# Patient Record
Sex: FEMALE | Race: WHITE | NOT HISPANIC OR LATINO
[De-identification: names, ages, dates, MRNs, and addresses within clinical notes are randomized per-mention and may not be internally consistent; named-entity substitution may affect disease eponyms.]

---

## 2022-08-22 PROBLEM — Z00.00 ENCOUNTER FOR PREVENTIVE HEALTH EXAMINATION: Status: ACTIVE | Noted: 2022-08-22

## 2022-08-23 ENCOUNTER — APPOINTMENT (OUTPATIENT)
Dept: OTOLARYNGOLOGY | Facility: CLINIC | Age: 82
End: 2022-08-23

## 2022-08-23 VITALS — HEIGHT: 66 IN | TEMPERATURE: 95.8 F | WEIGHT: 150 LBS | BODY MASS INDEX: 24.11 KG/M2

## 2022-08-23 DIAGNOSIS — H93.8X3 OTHER SPECIFIED DISORDERS OF EAR, BILATERAL: ICD-10-CM

## 2022-08-23 DIAGNOSIS — H61.23 IMPACTED CERUMEN, BILATERAL: ICD-10-CM

## 2022-08-23 PROCEDURE — 69210 REMOVE IMPACTED EAR WAX UNI: CPT

## 2022-08-23 PROCEDURE — 99203 OFFICE O/P NEW LOW 30 MIN: CPT | Mod: 25

## 2022-08-23 RX ORDER — ATORVASTATIN CALCIUM 10 MG/1
10 TABLET, FILM COATED ORAL
Refills: 0 | Status: ACTIVE | COMMUNITY

## 2022-08-23 RX ORDER — ENALAPRIL MALEATE 10 MG/1
10 TABLET ORAL
Refills: 0 | Status: ACTIVE | COMMUNITY

## 2022-08-23 NOTE — ASSESSMENT
[FreeTextEntry1] : Patient with hx of cerumen issues.  Here for cerumen removal.  Bilateral impacted cerumen removed and exam normal after.  Discussed audio but patient will defer - followup in one year and as necessary

## 2022-08-23 NOTE — HISTORY OF PRESENT ILLNESS
[de-identified] : Hx of cerumen in past.  Used to be Dr Soria patient and had yearly cleaning - no eval for about 2 years now.  Occ feels ears clogged.  No prior ear problems.

## 2022-08-23 NOTE — PHYSICAL EXAM
[Midline] : trachea located in midline position [Normal] : no rashes [de-identified] : bilat impacted cerumen  [de-identified] : after cerumen removal

## 2023-02-26 ENCOUNTER — INPATIENT (INPATIENT)
Facility: HOSPITAL | Age: 83
LOS: 1 days | Discharge: HOME | DRG: 378 | End: 2023-02-28
Attending: SURGERY | Admitting: SURGERY
Payer: MEDICARE

## 2023-02-26 VITALS
DIASTOLIC BLOOD PRESSURE: 87 MMHG | OXYGEN SATURATION: 100 % | HEART RATE: 87 BPM | TEMPERATURE: 98 F | SYSTOLIC BLOOD PRESSURE: 165 MMHG | RESPIRATION RATE: 18 BRPM

## 2023-02-26 DIAGNOSIS — Z90.49 ACQUIRED ABSENCE OF OTHER SPECIFIED PARTS OF DIGESTIVE TRACT: Chronic | ICD-10-CM

## 2023-02-26 DIAGNOSIS — Z98.890 OTHER SPECIFIED POSTPROCEDURAL STATES: Chronic | ICD-10-CM

## 2023-02-26 DIAGNOSIS — K92.2 GASTROINTESTINAL HEMORRHAGE, UNSPECIFIED: ICD-10-CM

## 2023-02-26 LAB
ABO RH CONFIRMATION: SIGNIFICANT CHANGE UP
ALBUMIN SERPL ELPH-MCNC: 3.1 G/DL — LOW (ref 3.3–5)
ALP SERPL-CCNC: 57 U/L — SIGNIFICANT CHANGE UP (ref 40–120)
ALT FLD-CCNC: 26 U/L — SIGNIFICANT CHANGE UP (ref 12–78)
ANION GAP SERPL CALC-SCNC: 4 MMOL/L — LOW (ref 5–17)
APTT BLD: 25.1 SEC — LOW (ref 27.5–35.5)
AST SERPL-CCNC: 21 U/L — SIGNIFICANT CHANGE UP (ref 15–37)
BASOPHILS # BLD AUTO: 0.07 K/UL — SIGNIFICANT CHANGE UP (ref 0–0.2)
BASOPHILS NFR BLD AUTO: 0.6 % — SIGNIFICANT CHANGE UP (ref 0–2)
BILIRUB SERPL-MCNC: 0.6 MG/DL — SIGNIFICANT CHANGE UP (ref 0.2–1.2)
BUN SERPL-MCNC: 14 MG/DL — SIGNIFICANT CHANGE UP (ref 7–23)
CALCIUM SERPL-MCNC: 8.3 MG/DL — LOW (ref 8.5–10.1)
CHLORIDE SERPL-SCNC: 108 MMOL/L — SIGNIFICANT CHANGE UP (ref 96–108)
CO2 SERPL-SCNC: 25 MMOL/L — SIGNIFICANT CHANGE UP (ref 22–31)
CREAT SERPL-MCNC: 0.65 MG/DL — SIGNIFICANT CHANGE UP (ref 0.5–1.3)
EGFR: 88 ML/MIN/1.73M2 — SIGNIFICANT CHANGE UP
EOSINOPHIL # BLD AUTO: 0.16 K/UL — SIGNIFICANT CHANGE UP (ref 0–0.5)
EOSINOPHIL NFR BLD AUTO: 1.5 % — SIGNIFICANT CHANGE UP (ref 0–6)
FLUAV AG NPH QL: SIGNIFICANT CHANGE UP
FLUBV AG NPH QL: SIGNIFICANT CHANGE UP
GLUCOSE SERPL-MCNC: 128 MG/DL — HIGH (ref 70–99)
HCT VFR BLD CALC: 28.9 % — LOW (ref 34.5–45)
HCT VFR BLD CALC: 30.4 % — LOW (ref 34.5–45)
HGB BLD-MCNC: 10.4 G/DL — LOW (ref 11.5–15.5)
HGB BLD-MCNC: 9.8 G/DL — LOW (ref 11.5–15.5)
IMM GRANULOCYTES NFR BLD AUTO: 0.3 % — SIGNIFICANT CHANGE UP (ref 0–0.9)
INR BLD: 1.05 RATIO — SIGNIFICANT CHANGE UP (ref 0.88–1.16)
LIDOCAIN IGE QN: 238 U/L — SIGNIFICANT CHANGE UP (ref 73–393)
LYMPHOCYTES # BLD AUTO: 1.72 K/UL — SIGNIFICANT CHANGE UP (ref 1–3.3)
LYMPHOCYTES # BLD AUTO: 15.7 % — SIGNIFICANT CHANGE UP (ref 13–44)
MCHC RBC-ENTMCNC: 31.4 PG — SIGNIFICANT CHANGE UP (ref 27–34)
MCHC RBC-ENTMCNC: 31.8 PG — SIGNIFICANT CHANGE UP (ref 27–34)
MCHC RBC-ENTMCNC: 33.9 GM/DL — SIGNIFICANT CHANGE UP (ref 32–36)
MCHC RBC-ENTMCNC: 34.2 GM/DL — SIGNIFICANT CHANGE UP (ref 32–36)
MCV RBC AUTO: 92.6 FL — SIGNIFICANT CHANGE UP (ref 80–100)
MCV RBC AUTO: 93 FL — SIGNIFICANT CHANGE UP (ref 80–100)
MONOCYTES # BLD AUTO: 0.74 K/UL — SIGNIFICANT CHANGE UP (ref 0–0.9)
MONOCYTES NFR BLD AUTO: 6.7 % — SIGNIFICANT CHANGE UP (ref 2–14)
NEUTROPHILS # BLD AUTO: 8.25 K/UL — HIGH (ref 1.8–7.4)
NEUTROPHILS NFR BLD AUTO: 75.2 % — SIGNIFICANT CHANGE UP (ref 43–77)
NT-PROBNP SERPL-SCNC: 53 PG/ML — SIGNIFICANT CHANGE UP (ref 0–450)
PLATELET # BLD AUTO: 220 K/UL — SIGNIFICANT CHANGE UP (ref 150–400)
PLATELET # BLD AUTO: 227 K/UL — SIGNIFICANT CHANGE UP (ref 150–400)
POTASSIUM SERPL-MCNC: 3.5 MMOL/L — SIGNIFICANT CHANGE UP (ref 3.5–5.3)
POTASSIUM SERPL-SCNC: 3.5 MMOL/L — SIGNIFICANT CHANGE UP (ref 3.5–5.3)
PROT SERPL-MCNC: 6.3 GM/DL — SIGNIFICANT CHANGE UP (ref 6–8.3)
PROTHROM AB SERPL-ACNC: 12.2 SEC — SIGNIFICANT CHANGE UP (ref 10.5–13.4)
RBC # BLD: 3.12 M/UL — LOW (ref 3.8–5.2)
RBC # BLD: 3.27 M/UL — LOW (ref 3.8–5.2)
RBC # FLD: 12.7 % — SIGNIFICANT CHANGE UP (ref 10.3–14.5)
RBC # FLD: 12.8 % — SIGNIFICANT CHANGE UP (ref 10.3–14.5)
RSV RNA NPH QL NAA+NON-PROBE: SIGNIFICANT CHANGE UP
SARS-COV-2 RNA SPEC QL NAA+PROBE: SIGNIFICANT CHANGE UP
SODIUM SERPL-SCNC: 137 MMOL/L — SIGNIFICANT CHANGE UP (ref 135–145)
TROPONIN I, HIGH SENSITIVITY RESULT: 5.9 NG/L — SIGNIFICANT CHANGE UP
WBC # BLD: 10.97 K/UL — HIGH (ref 3.8–10.5)
WBC # BLD: 9.37 K/UL — SIGNIFICANT CHANGE UP (ref 3.8–10.5)
WBC # FLD AUTO: 10.97 K/UL — HIGH (ref 3.8–10.5)
WBC # FLD AUTO: 9.37 K/UL — SIGNIFICANT CHANGE UP (ref 3.8–10.5)

## 2023-02-26 PROCEDURE — 85027 COMPLETE CBC AUTOMATED: CPT

## 2023-02-26 PROCEDURE — 93010 ELECTROCARDIOGRAM REPORT: CPT

## 2023-02-26 PROCEDURE — 85018 HEMOGLOBIN: CPT

## 2023-02-26 PROCEDURE — 71045 X-RAY EXAM CHEST 1 VIEW: CPT | Mod: 26

## 2023-02-26 PROCEDURE — 83735 ASSAY OF MAGNESIUM: CPT

## 2023-02-26 PROCEDURE — 70450 CT HEAD/BRAIN W/O DYE: CPT | Mod: 26,MA

## 2023-02-26 PROCEDURE — 97163 PT EVAL HIGH COMPLEX 45 MIN: CPT | Mod: GP

## 2023-02-26 PROCEDURE — 93306 TTE W/DOPPLER COMPLETE: CPT

## 2023-02-26 PROCEDURE — 97116 GAIT TRAINING THERAPY: CPT | Mod: GP

## 2023-02-26 PROCEDURE — 74177 CT ABD & PELVIS W/CONTRAST: CPT | Mod: 26,MA

## 2023-02-26 PROCEDURE — 72125 CT NECK SPINE W/O DYE: CPT | Mod: 26,MA

## 2023-02-26 PROCEDURE — 36430 TRANSFUSION BLD/BLD COMPNT: CPT

## 2023-02-26 PROCEDURE — 84100 ASSAY OF PHOSPHORUS: CPT

## 2023-02-26 PROCEDURE — 36415 COLL VENOUS BLD VENIPUNCTURE: CPT

## 2023-02-26 PROCEDURE — 99291 CRITICAL CARE FIRST HOUR: CPT

## 2023-02-26 PROCEDURE — 80048 BASIC METABOLIC PNL TOTAL CA: CPT

## 2023-02-26 RX ORDER — CHLORHEXIDINE GLUCONATE 213 G/1000ML
1 SOLUTION TOPICAL
Refills: 0 | Status: DISCONTINUED | OUTPATIENT
Start: 2023-02-26 | End: 2023-02-28

## 2023-02-26 RX ORDER — ATORVASTATIN CALCIUM 80 MG/1
1 TABLET, FILM COATED ORAL
Qty: 0 | Refills: 0 | DISCHARGE

## 2023-02-26 RX ORDER — ASPIRIN/CALCIUM CARB/MAGNESIUM 324 MG
1 TABLET ORAL
Qty: 0 | Refills: 0 | DISCHARGE

## 2023-02-26 RX ORDER — MULTIVIT-MIN/FERROUS GLUCONATE 9 MG/15 ML
1 LIQUID (ML) ORAL
Qty: 0 | Refills: 0 | DISCHARGE

## 2023-02-26 RX ORDER — SODIUM CHLORIDE 9 MG/ML
1000 INJECTION, SOLUTION INTRAVENOUS
Refills: 0 | Status: DISCONTINUED | OUTPATIENT
Start: 2023-02-26 | End: 2023-02-28

## 2023-02-26 RX ORDER — SODIUM CHLORIDE 9 MG/ML
1000 INJECTION INTRAMUSCULAR; INTRAVENOUS; SUBCUTANEOUS ONCE
Refills: 0 | Status: COMPLETED | OUTPATIENT
Start: 2023-02-26 | End: 2023-02-26

## 2023-02-26 RX ORDER — CHOLECALCIFEROL (VITAMIN D3) 125 MCG
1 CAPSULE ORAL
Qty: 0 | Refills: 0 | DISCHARGE

## 2023-02-26 RX ADMIN — SODIUM CHLORIDE 1000 MILLILITER(S): 9 INJECTION INTRAMUSCULAR; INTRAVENOUS; SUBCUTANEOUS at 21:21

## 2023-02-26 NOTE — ED PROVIDER NOTE - PROGRESS NOTE DETAILS
Pt has active GI bleed. Dr. Calvo of GI consulted, and Dr. Tirado of IR consulted. No need for intervention at this time. Pt had episode of hypotension, and admitted to ICU.

## 2023-02-26 NOTE — ED ADULT NURSE REASSESSMENT NOTE - NS ED NURSE REASSESS COMMENT FT1
Patient at around 2035 stated to nursing staff that "I am starting to feel very nauseous and off". Pt then had another syncopal episode that lasted approximately 5 seconds. Dr. Gaines at bedside, pt to be evaluated by ICU team.

## 2023-02-26 NOTE — PATIENT PROFILE ADULT - FALL HARM RISK - HARM RISK INTERVENTIONS
Assistance with ambulation/Assistance OOB with selected safe patient handling equipment/Communicate Risk of Fall with Harm to all staff/Monitor gait and stability/Reinforce activity limits and safety measures with patient and family/Sit up slowly, dangle for a short time, stand at bedside before walking/Tailored Fall Risk Interventions/Visual Cue: Yellow wristband and red socks/Bed in lowest position, wheels locked, appropriate side rails in place/Call bell, personal items and telephone in reach/Instruct patient to call for assistance before getting out of bed or chair/Non-slip footwear when patient is out of bed/[place] to call system/Physically safe environment - no spills, clutter or unnecessary equipment/Purposeful Proactive Rounding/Room/bathroom lighting operational, light cord in reach

## 2023-02-26 NOTE — H&P ADULT - ASSESSMENT
Impression:  1. acute blood loss anemia  2. acute gastrointestinal hemorrhage  3. essential HTN      Plan:  Admit to CICU.     Neuro - stable, nonfocal, CTH and C-spine, negative    CV -  hemodynamics stable, NSR 90s, no tachycardia currently            hold anti-HTN for now in face of GIB            no APT     Pulm -  stable on RA                CXR without active disease    GI -  NPO           serial CBCs          currently receiving 1U pRBCs           GI consulted - Dr Cuevas by ED           IR consulted - Dr Olvera by ED           if further bleeding with drop in H/H will reengage IR/GI overnight           tx for Hbg<8 or ongoing signs of active bleeding           no APT or AC    Renal - Cr stable, strict I/Os, gentle IV hydration with balanced fluids, BMP in am    Heme -  no pharmacologic DVT PPx/APT/AC in face of GIB, SCDs, serial CBCs, tx for Hbg<8 or active signs of bleeding.     ID - no indication for abx at this time.    Endo -  BS stable.                                                         Impression:  1. acute blood loss anemia  2. acute gastrointestinal hemorrhage  3. essential HTN      Plan:  Admit to CICU. Case and plan discussed with eICU attending Dr Wells.    Neuro - stable, nonfocal, CTH and C-spine, negative    CV -  hemodynamics stable, NSR 90s, no tachycardia currently            hold anti-HTN for now in face of GIB            no APT     Pulm -  stable on RA                CXR without active disease    GI -  NPO           serial CBCs          currently receiving 1U pRBCs           GI consulted - Dr Cuevas by ED           IR consulted - Dr Olvera by ED           if further bleeding with drop in H/H will reengage IR/GI overnight           tx for Hbg<8 or ongoing signs of active bleeding           no APT or AC    Renal - Cr stable, strict I/Os, gentle IV hydration with balanced fluids, BMP in am    Heme -  no pharmacologic DVT PPx/APT/AC in face of GIB, SCDs, serial CBCs, tx for Hbg<8 or active signs of bleeding.     ID - no indication for abx at this time.    Endo -  BS stable.                                                         Impression:  1. acute blood loss anemia  2. acute gastrointestinal hemorrhage  3. essential HTN      Plan:  Admit to CICU. Case and plan discussed with eICU attending Dr Wells.    Neuro - stable, nonfocal, CTH and C-spine, negative    CV -  hemodynamics stable, NSR 90s, no tachycardia currently            hold anti-HTN for now in face of GIB            no APT     Pulm -  stable on RA                CXR without active disease    GI -  NPO           serial CBCs          currently receiving 1U pRBCs           GI consulted - Dr Cuevas by ED           IR consulted - Dr Olvera by ED, cont to monitor for now,           if further bleeding with drop in H/H will reengage IR/GI overnight           tx for Hbg<8 or ongoing signs of active bleeding           no APT or AC    Renal - Cr stable, strict I/Os, gentle IV hydration with balanced fluids, BMP in am    Heme -  no pharmacologic DVT PPx/APT/AC in face of GIB, SCDs, serial CBCs, tx for Hbg<8 or active signs of bleeding.     ID - no indication for abx at this time.    Endo -  BS stable.

## 2023-02-26 NOTE — PROVIDER CONTACT NOTE (EICU) - RECOMMENDATIONS
- Admit to ICU for monitoring  - CBC q4h for now  - Ordered for 1 un PRBC transfusion for now  - f/u IR/GI if CBC continues to drop in light of further bleeding

## 2023-02-26 NOTE — H&P ADULT - NSHPLABSRESULTS_GEN_ALL_CORE
LABS:                        9.8    9.37  )-----------( 220      ( 26 Feb 2023 20:03 )             28.9     02-26    137  |  108  |  14  ----------------------------<  128<H>  3.5   |  25  |  0.65    Ca    8.3<L>      26 Feb 2023 18:36    TPro  6.3  /  Alb  3.1<L>  /  TBili  0.6  /  DBili  x   /  AST  21  /  ALT  26  /  AlkPhos  57  02-26      CAPILLARY BLOOD GLUCOSE      POCT Blood Glucose.: 105 mg/dL (26 Feb 2023 17:02)    PT/INR - ( 26 Feb 2023 20:03 )   PT: 12.2 sec;   INR: 1.05 ratio         PTT - ( 26 Feb 2023 20:03 )  PTT:25.1 sec    RADIOLOGY:   ACC: [id number] EXAM:  CT ABDOMEN AND PELVIS IC   ORDERED BY: MONSE BLUE     PROCEDURE DATE:  02/26/2023          INTERPRETATION:  CLINICAL INFORMATION: maroon colored stool JCT    COMPARISON: None.    CONTRAST/COMPLICATIONS:  IV Contrast: Omnipaque 350  90 cc administered   10 cc discarded  Oral Contrast: NONE  Complications: None reported at time of study completion    PROCEDURE:  CT of the Abdomen and Pelvis was performed.  Precontrast, Arterial and Delayed phases were performed.  Sagittal and coronal reformats were performed.    FINDINGS:  IMPRESSION: Active GI bleed at the hepatic flexure.    A 3.5 cm left renal cystic lesion with enhancing nodule suspicious for   cystic RCC.    The findings were discussed with Dr. MONSE BLUE on 2/26/2023 7:22 PM.    --- End of Report ---      YAN LOPEZ MD; Attending Radiologist  This document has been electronically signed. Feb 26 2023  7:28PM    ACC: [id number] EXAM:  CT CERVICAL SPINE   ORDERED BY: MONSE BLUE     ACC: [id number] EXAM:  CT BRAIN   ORDERED BY: MONSE BLUE     PROCEDURE DATE:  02/26/2023          INTERPRETATION:  CLINICAL INDICATION: Fall    CT BRAIN:  IMPRESSION:  CT head: No evidence of acute transcortical infarct, acute intracranial   hemorrhage or mass effect.    CT cervical spine: No acute fracture or traumatic subluxation.    --- End of Report ---    MINE GARCIA MD; Attending Radiologist  This document has been electronically signed. Feb 26 2023  6:17PM

## 2023-02-26 NOTE — H&P ADULT - NSHPPHYSICALEXAM_GEN_ALL_CORE
ICU Vital Signs Last 24 Hrs  T(C): 36.9 (26 Feb 2023 21:11), Max: 36.9 (26 Feb 2023 17:00)  T(F): 98.4 (26 Feb 2023 21:11), Max: 98.5 (26 Feb 2023 17:00)  HR: 92 (26 Feb 2023 21:11) (86 - 92)  BP: 144/84 (26 Feb 2023 21:11) (106/64 - 165/87)  BP(mean): 95 (26 Feb 2023 21:11) (76 - 95)  ABP: --  ABP(mean): --  RR: 16 (26 Feb 2023 21:11) (16 - 18)  SpO2: 100% (26 Feb 2023 21:11) (97% - 100%)    O2 Parameters below as of 26 Feb 2023 21:11  Patient On (Oxygen Delivery Method): room air    Physical Examination:    General: No acute distress.  Alert, oriented, interactive, nonfocal    HEENT: Pupils equal, reactive to light.  Symmetric.    PULM: Clear to auscultation bilaterally    CVS: Regular rate and rhythm    ABD: obese, Soft, nondistended, nontender, normoactive bowel sounds, no rebound or guarding    EXT: No edema, nontender    SKIN: Warm and well perfused, no rashes noted.

## 2023-02-26 NOTE — ED ADULT TRIAGE NOTE - CHIEF COMPLAINT QUOTE
Pt BIBEMS from home, c/o unwitnessed syncopal episode. Endorsed dizziness prior episode. Unknown head strike. On 81mg ASA. Had one episode of rectal bleeding today. . Denies chest pain. STAT EKG to be completed. Dr. Gaines aware. Neuro alert called. Brought directly to CT scan.

## 2023-02-26 NOTE — ED PROVIDER NOTE - OBJECTIVE STATEMENT
81 yo female wPMHx of high blood pressure presents to the ED BIBEMS from home c/o unwitnessed syncopal episode. Endorsed dizziness prior episode. Pt is on 81 mg ASA. Pt had one episode of rectal bleeding today after the syncopal episode. Pt was in the kitchen felt dizzy, went to the bathroom remembers kneeling down. +LOC. Pt states she has been having diarrhea for the past 2 days. Denies head strike, abd pain.

## 2023-02-26 NOTE — PROVIDER CONTACT NOTE (EICU) - BACKGROUND
Patient is an 81 yo F w/ HTN, HLD, macular degeneration who presented today after witnessed syncopal episode associated with rectal bleeding. CTA AP showed active bleeding at hepatic flexure. As per ER noted with one episode of bloody stools in ED (small), hemodynamics stable. Pt also admits to 2days of diarrhea unsure if bloody given the severity of her macular degeneration. Denies CP, SOB, abd pain, vomiting, hematemesis, prior GI bleeding, Last colonscopy >15years ago which she reports was normal. Admits to daily baby ASA use, no AC or NSAIDs, IR consulted felt that bleeding is diverticular in nature. Patient is an 81 yo F w/ HTN, HLD, macular degeneration who presented today after witnessed syncopal episode associated with rectal bleeding. CTA AP showed active bleeding at hepatic flexure. As per ER discussion with GI and IR, no intervention at this time. Patient currently HD stable. Hb 10.4 on presentation with repeat 9.8. Ordered for 1 un PRBC. Only on ASA 81 at home, no AC or other antiplatelets.

## 2023-02-26 NOTE — ED PROVIDER NOTE - PRO INTERPRETER NEED 2
Please advise      xray of left shoulder -  FINDINGS:  Two views of the shoulder demonstrate no acute fracture or  dislocation. The previously noted dystrophic soft tissue calcification  inferior to the glenohumeral joint is not identified currently.        IMPRESSION:       No acute fracture is seen.   English

## 2023-02-26 NOTE — H&P ADULT - HISTORY OF PRESENT ILLNESS
82F with PMHx HTN, HLD, macular degeneration, sp appy, sp D/C BIBEMS following witnessed syncopal episode associated with rectal bleeding. Workup in ED showed anemia, CTA/P angio with active bleeding at hepatic flexure. As per ER noted with one episode of bloody stools in ED (small), hemodynamics stable. Pt also admits to 2days of diarrhea unsure if bloody given the severity of her macular degeneration. Denies CP, SOB, abd pain, vomiting, hematemesis, prior GI bleeding, Last colonscopy >15years ago which she reports was normal. Admits to daily baby ASA use, no AC or NSAIDs, IR consulted felt that bleeding is diverticular in nature.

## 2023-02-26 NOTE — ED ADULT NURSE NOTE - PRIMARY CARE PROVIDER
Advance Care Planning  People with COVID-19 may have no symptoms, mild symptoms, such as fever, cough, and shortness of breath or they may have more severe illness, developing severe and fatal pneumonia. As a result, Advance Care Planning with attention to naming a health care decision maker (someone you trust to make healthcare decisions for you if you could not speak for yourself) and sharing other health care preferences is important BEFORE a possible health crisis. Please contact your Primary Care Provider to discuss Advance Care Planning. Preventing the Spread of Coronavirus Disease 2019 in Homes and Residential Communities  For the most recent information go to [email or web address]    Prevention steps for People with confirmed or suspected COVID-19 (including persons under investigation) who do not need to be hospitalized  and   People with confirmed COVID-19 who were hospitalized and determined to be medically stable to go home    Your healthcare provider and public health staff will evaluate whether you can be cared for at home. If it is determined that you do not need to be hospitalized and can be isolated at home, you will be monitored by staff from your local or state health department. You should follow the prevention steps below until a healthcare provider or local or state health department says you can return to your normal activities. Stay home except to get medical care  People who are mildly ill with COVID-19 are able to isolate at home during their illness. You should restrict activities outside your home, except for getting medical care. Do not go to work, school, or public areas. Avoid using public transportation, ride-sharing, or taxis. Separate yourself from other people and animals in your home  People: As much as possible, you should stay in a specific room and away from other people in your home.  Also, you should use a separate before eating or preparing food. If soap and water are not readily available, use an alcohol-based hand  with at least 60% alcohol, covering all surfaces of your hands and rubbing them together until they feel dry. Soap and water are the best option if hands are visibly dirty. Avoid touching your eyes, nose, and mouth with unwashed hands. Avoid sharing personal household items  You should not share dishes, drinking glasses, cups, eating utensils, towels, or bedding with other people or pets in your home. After using these items, they should be washed thoroughly with soap and water. Clean all high-touch surfaces everyday  High touch surfaces include counters, tabletops, doorknobs, bathroom fixtures, toilets, phones, keyboards, tablets, and bedside tables. Also, clean any surfaces that may have blood, stool, or body fluids on them. Use a household cleaning spray or wipe, according to the label instructions. Labels contain instructions for safe and effective use of the cleaning product including precautions you should take when applying the product, such as wearing gloves and making sure you have good ventilation during use of the product. Monitor your symptoms  Seek prompt medical attention if your illness is worsening (e.g., difficulty breathing). Before seeking care, call your healthcare provider and tell them that you have, or are being evaluated for, COVID-19. Put on a facemask before you enter the facility. These steps will help the healthcare providers office to keep other people in the office or waiting room from getting infected or exposed. Ask your healthcare provider to call the local or state health department. Persons who are placed under active monitoring or facilitated self-monitoring should follow instructions provided by their local health department or occupational health professionals, as appropriate. When working with your local health department check their available hours.   If you have a medical emergency and need to call 911, notify the dispatch personnel that you have, or are being evaluated for COVID-19. If possible, put on a facemask before emergency medical services arrive. Discontinuing home isolation  Patients with confirmed COVID-19 should remain under home isolation precautions until the risk of secondary transmission to others is thought to be low. The decision to discontinue home isolation precautions should be made on a case-by-case basis, in consultation with healthcare providers and state and local health departments. See MD note

## 2023-02-26 NOTE — ED ADULT NURSE REASSESSMENT NOTE - NS ED NURSE REASSESS COMMENT FT1
At 1945, patient had witnessed syncopal episode at , pt eyes rolled back and was not responding to verbal/physical stimuli for approximately 5 seconds and became A+OX3 and stated that she does not recall fainting. Dr. Gaines at bedside to assess patient. 1 unit of PRBC ordered to be given. At 1945, patient had witnessed syncopal episode at , pt eyes rolled back and was not responding to verbal/physical stimuli for approximately 5 seconds and became A+OX3 and stated that she does not recall fainting. Dr. Gaines at bedside to assess patient. Assessed patient for rectal bleeding, found large amount of dark blood stool per rectum. 1 unit of PRBC ordered to be given.

## 2023-02-26 NOTE — ED ADULT NURSE NOTE - OBJECTIVE STATEMENT
Patient presents to ED with niece for syncopal episode. Pt states that she has been having diarrhea for past 2 days, unsure if there was blood in stool. Pt states she felt dizzy and woke up with blood on the floor. Pt denies head strike, abdominal pain, nausea/vomiting. Pt appears pale in ED.

## 2023-02-26 NOTE — ED ADULT NURSE NOTE - NSIMPLEMENTINTERV_GEN_ALL_ED
Implemented All Fall Risk Interventions:  [place] to call system. Call bell, personal items and telephone within reach. Instruct patient to call for assistance. Room bathroom lighting operational. Non-slip footwear when patient is off stretcher. Physically safe environment: no spills, clutter or unnecessary equipment. Stretcher in lowest position, wheels locked, appropriate side rails in place. Provide visual cue, wrist band, yellow gown, etc. Monitor gait and stability. Monitor for mental status changes and reorient to person, place, and time. Review medications for side effects contributing to fall risk. Reinforce activity limits and safety measures with patient and family.

## 2023-02-26 NOTE — ED PROVIDER NOTE - CPE EDP [facility] NORM
normal... Consent (Marginal Mandibular)/Introductory Paragraph: The rationale for Mohs was explained to the patient and consent was obtained. The risks, benefits and alternatives to therapy were discussed in detail. Specifically, the risks of damage to the marginal mandibular branch of the facial nerve, infection, scarring, bleeding, prolonged wound healing, incomplete removal, allergy to anesthesia, and recurrence were addressed. Prior to the procedure, the treatment site was clearly identified and confirmed by the patient. All components of Universal Protocol/PAUSE Rule completed.

## 2023-02-27 DIAGNOSIS — N28.89 OTHER SPECIFIED DISORDERS OF KIDNEY AND URETER: ICD-10-CM

## 2023-02-27 LAB
ANION GAP SERPL CALC-SCNC: 4 MMOL/L — LOW (ref 5–17)
BUN SERPL-MCNC: 11 MG/DL — SIGNIFICANT CHANGE UP (ref 7–23)
CALCIUM SERPL-MCNC: 8.2 MG/DL — LOW (ref 8.5–10.1)
CHLORIDE SERPL-SCNC: 111 MMOL/L — HIGH (ref 96–108)
CO2 SERPL-SCNC: 24 MMOL/L — SIGNIFICANT CHANGE UP (ref 22–31)
CREAT SERPL-MCNC: 0.53 MG/DL — SIGNIFICANT CHANGE UP (ref 0.5–1.3)
EGFR: 93 ML/MIN/1.73M2 — SIGNIFICANT CHANGE UP
GLUCOSE SERPL-MCNC: 123 MG/DL — HIGH (ref 70–99)
HCT VFR BLD CALC: 27.8 % — LOW (ref 34.5–45)
HCT VFR BLD CALC: 29.4 % — LOW (ref 34.5–45)
HCT VFR BLD CALC: 30.2 % — LOW (ref 34.5–45)
HGB BLD-MCNC: 10.3 G/DL — LOW (ref 11.5–15.5)
HGB BLD-MCNC: 10.3 G/DL — LOW (ref 11.5–15.5)
HGB BLD-MCNC: 9.7 G/DL — LOW (ref 11.5–15.5)
HGB BLD-MCNC: 9.8 G/DL — LOW (ref 11.5–15.5)
MAGNESIUM SERPL-MCNC: 2.1 MG/DL — SIGNIFICANT CHANGE UP (ref 1.6–2.6)
MCHC RBC-ENTMCNC: 31.8 PG — SIGNIFICANT CHANGE UP (ref 27–34)
MCHC RBC-ENTMCNC: 32.1 PG — SIGNIFICANT CHANGE UP (ref 27–34)
MCHC RBC-ENTMCNC: 32.3 PG — SIGNIFICANT CHANGE UP (ref 27–34)
MCHC RBC-ENTMCNC: 34.1 GM/DL — SIGNIFICANT CHANGE UP (ref 32–36)
MCHC RBC-ENTMCNC: 35 GM/DL — SIGNIFICANT CHANGE UP (ref 32–36)
MCHC RBC-ENTMCNC: 35.3 GM/DL — SIGNIFICANT CHANGE UP (ref 32–36)
MCV RBC AUTO: 91.1 FL — SIGNIFICANT CHANGE UP (ref 80–100)
MCV RBC AUTO: 92.2 FL — SIGNIFICANT CHANGE UP (ref 80–100)
MCV RBC AUTO: 93.2 FL — SIGNIFICANT CHANGE UP (ref 80–100)
PHOSPHATE SERPL-MCNC: 3 MG/DL — SIGNIFICANT CHANGE UP (ref 2.5–4.5)
PLATELET # BLD AUTO: 199 K/UL — SIGNIFICANT CHANGE UP (ref 150–400)
PLATELET # BLD AUTO: 201 K/UL — SIGNIFICANT CHANGE UP (ref 150–400)
PLATELET # BLD AUTO: 207 K/UL — SIGNIFICANT CHANGE UP (ref 150–400)
POTASSIUM SERPL-MCNC: 3.7 MMOL/L — SIGNIFICANT CHANGE UP (ref 3.5–5.3)
POTASSIUM SERPL-SCNC: 3.7 MMOL/L — SIGNIFICANT CHANGE UP (ref 3.5–5.3)
RBC # BLD: 3.05 M/UL — LOW (ref 3.8–5.2)
RBC # BLD: 3.19 M/UL — LOW (ref 3.8–5.2)
RBC # BLD: 3.24 M/UL — LOW (ref 3.8–5.2)
RBC # FLD: 12.7 % — SIGNIFICANT CHANGE UP (ref 10.3–14.5)
RBC # FLD: 13 % — SIGNIFICANT CHANGE UP (ref 10.3–14.5)
RBC # FLD: 13.2 % — SIGNIFICANT CHANGE UP (ref 10.3–14.5)
SODIUM SERPL-SCNC: 139 MMOL/L — SIGNIFICANT CHANGE UP (ref 135–145)
WBC # BLD: 6.2 K/UL — SIGNIFICANT CHANGE UP (ref 3.8–10.5)
WBC # BLD: 7 K/UL — SIGNIFICANT CHANGE UP (ref 3.8–10.5)
WBC # BLD: 7.15 K/UL — SIGNIFICANT CHANGE UP (ref 3.8–10.5)
WBC # FLD AUTO: 6.2 K/UL — SIGNIFICANT CHANGE UP (ref 3.8–10.5)
WBC # FLD AUTO: 7 K/UL — SIGNIFICANT CHANGE UP (ref 3.8–10.5)
WBC # FLD AUTO: 7.15 K/UL — SIGNIFICANT CHANGE UP (ref 3.8–10.5)

## 2023-02-27 PROCEDURE — 99222 1ST HOSP IP/OBS MODERATE 55: CPT

## 2023-02-27 PROCEDURE — 93306 TTE W/DOPPLER COMPLETE: CPT | Mod: 26

## 2023-02-27 RX ORDER — SOD SULF/SODIUM/NAHCO3/KCL/PEG
2 SOLUTION, RECONSTITUTED, ORAL ORAL ONCE
Refills: 0 | Status: DISCONTINUED | OUTPATIENT
Start: 2023-02-27 | End: 2023-02-28

## 2023-02-27 RX ORDER — ATORVASTATIN CALCIUM 80 MG/1
20 TABLET, FILM COATED ORAL AT BEDTIME
Refills: 0 | Status: DISCONTINUED | OUTPATIENT
Start: 2023-02-27 | End: 2023-02-28

## 2023-02-27 RX ADMIN — Medication 20 MILLIGRAM(S): at 10:53

## 2023-02-27 RX ADMIN — ATORVASTATIN CALCIUM 20 MILLIGRAM(S): 80 TABLET, FILM COATED ORAL at 21:31

## 2023-02-27 RX ADMIN — CHLORHEXIDINE GLUCONATE 1 APPLICATION(S): 213 SOLUTION TOPICAL at 05:39

## 2023-02-27 NOTE — PROGRESS NOTE ADULT - SUBJECTIVE AND OBJECTIVE BOX
Patient is a 82y old  Female who presents with a chief complaint of     BRIEF HOSPITAL COURSE: 81yo female with PMHx HTN, HLD, macular degeneration, sp appy, sp D/C BIBEMS following witnessed syncopal episode associated with rectal bleeding. Workup in ED showed anemia, CTA/P angio with active bleeding at hepatic flexure. As per ER noted with one episode of bloody stools in ED (small), hemodynamics stable. Pt also admits to 2days of diarrhea unsure if bloody given the severity of her macular degeneration. Denies CP, SOB, abd pain, vomiting, hematemesis, prior GI bleeding, Last colonscopy >15years ago which she reports was normal. Admits to daily baby ASA use, no AC or NSAIDs, IR consulted felt that bleeding is diverticular in nature.     2/27 pt reports feeling well. denies abd pain , nausea, dizziness, c/o dry lips and mouth. no bloody BM overnight.     PAST MEDICAL & SURGICAL HISTORY:  High blood pressure      Hyperlipidemia      Macular degeneration      S/P appendectomy      S/P dilation and curettage            Medications:    enalapril 20 milliGRAM(s) Oral daily                atorvastatin 20 milliGRAM(s) Oral at bedtime    lactated ringers. 1000 milliLiter(s) IV Continuous <Continuous>      chlorhexidine 4% Liquid 1 Application(s) Topical <User Schedule>            ICU Vital Signs Last 24 Hrs  T(C): 36.8 (27 Feb 2023 05:44), Max: 36.9 (26 Feb 2023 17:00)  T(F): 98.2 (27 Feb 2023 05:44), Max: 98.5 (26 Feb 2023 17:00)  HR: 92 (27 Feb 2023 08:00) (67 - 101)  BP: 137/67 (27 Feb 2023 08:00) (81/69 - 165/87)  BP(mean): 84 (27 Feb 2023 08:00) (65 - 100)  ABP: --  ABP(mean): --  RR: 18 (27 Feb 2023 08:00) (15 - 21)  SpO2: 96% (27 Feb 2023 08:00) (95% - 100%)    O2 Parameters below as of 27 Feb 2023 04:00  Patient On (Oxygen Delivery Method): room air                I&O's Detail        LABS:                        9.8    6.20  )-----------( 201      ( 27 Feb 2023 04:48 )             27.8     02-27    139  |  111<H>  |  11  ----------------------------<  123<H>  3.7   |  24  |  0.53    Ca    8.2<L>      27 Feb 2023 04:48  Phos  3.0     02-27  Mg     2.1     02-27    TPro  6.3  /  Alb  3.1<L>  /  TBili  0.6  /  DBili  x   /  AST  21  /  ALT  26  /  AlkPhos  57  02-26          CAPILLARY BLOOD GLUCOSE      POCT Blood Glucose.: 105 mg/dL (26 Feb 2023 17:02)    PT/INR - ( 26 Feb 2023 20:03 )   PT: 12.2 sec;   INR: 1.05 ratio         PTT - ( 26 Feb 2023 20:03 )  PTT:25.1 sec    CULTURES:      Physical Examination:    General: No acute distress.  awake and alert  HEENT: Pupils equal, reactive to light.  Symmetric. legally blind. dry mucous membranes  PULM: Clear to auscultation bilaterally, no crackles or wheezing  NECK: Supple, no lymphadenopathy, trachea midline  CVS: Regular rate and rhythm, no murmur  ABD: Soft, nondistended, nontender, normoactive bowel sounds  EXT: No edema, nontende  SKIN: Warm and well perfused, no rashes noted.  NEURO: Alert, oriented, interactive, nonfocal    DEVICES:     RADIOLOGY:   < from: CT Abdomen and Pelvis w/ IV Cont (02.26.23 @ 18:57) >    IMPRESSION: Active GI bleed at the hepatic flexure.    A 3.5 cm left renal cystic lesion with enhancing nodule suspicious for   cystic RCC.    < end of copied text >         Patient is a 82y old  Female who presents with a chief complaint of     BRIEF HOSPITAL COURSE: 81yo female with PMHx HTN, HLD, macular degeneration, sp appy, sp D/C BIBEMS following witnessed syncopal episode associated with rectal bleeding. Workup in ED showed anemia, CTA/P angio with active bleeding at hepatic flexure. As per ER noted with one episode of bloody stools in ED (small), hemodynamics stable. Pt also admits to 2days of diarrhea unsure if bloody given the severity of her macular degeneration. Denies CP, SOB, abd pain, vomiting, hematemesis, prior GI bleeding, Last colonscopy >15years ago which she reports was normal. Admits to daily baby ASA use, no AC or NSAIDs, IR consulted felt that bleeding is diverticular in nature.     2/27 pt reports feeling well. denies abd pain , nausea, dizziness, c/o dry lips and mouth. no bloody BM overnight.     PAST MEDICAL & SURGICAL HISTORY:  High blood pressure  Hyperlipidemia  Macular degeneration  S/P appendectomy  S/P dilation and curettage    Medications:  enalapril 20 milliGRAM(s) Oral daily  atorvastatin 20 milliGRAM(s) Oral at bedtime  lactated ringers. 1000 milliLiter(s) IV Continuous <Continuous>  chlorhexidine 4% Liquid 1 Application(s) Topical <User Schedule>      ICU Vital Signs Last 24 Hrs  T(C): 36.8 (27 Feb 2023 05:44), Max: 36.9 (26 Feb 2023 17:00)  T(F): 98.2 (27 Feb 2023 05:44), Max: 98.5 (26 Feb 2023 17:00)  HR: 92 (27 Feb 2023 08:00) (67 - 101)  BP: 137/67 (27 Feb 2023 08:00) (81/69 - 165/87)  BP(mean): 84 (27 Feb 2023 08:00) (65 - 100)  ABP: --  ABP(mean): --  RR: 18 (27 Feb 2023 08:00) (15 - 21)  SpO2: 96% (27 Feb 2023 08:00) (95% - 100%)    O2 Parameters below as of 27 Feb 2023 04:00  Patient On (Oxygen Delivery Method): room air    I&O's Detail        LABS:                        9.8    6.20  )-----------( 201      ( 27 Feb 2023 04:48 )             27.8     02-27    139  |  111<H>  |  11  ----------------------------<  123<H>  3.7   |  24  |  0.53    Ca    8.2<L>      27 Feb 2023 04:48  Phos  3.0     02-27  Mg     2.1     02-27    TPro  6.3  /  Alb  3.1<L>  /  TBili  0.6  /  DBili  x   /  AST  21  /  ALT  26  /  AlkPhos  57  02-26    CAPILLARY BLOOD GLUCOSE  POCT Blood Glucose.: 105 mg/dL (26 Feb 2023 17:02)  PT/INR - ( 26 Feb 2023 20:03 )   PT: 12.2 sec;   INR: 1.05 ratio    PTT - ( 26 Feb 2023 20:03 )  PTT:25.1 sec    CULTURES:    Physical Examination:    General: No acute distress.  awake and alert  HEENT: Pupils equal, reactive to light.  Symmetric. legally blind. dry mucous membranes  PULM: Clear to auscultation bilaterally, no crackles or wheezing  NECK: Supple, no lymphadenopathy, trachea midline  CVS: Regular rate and rhythm, no murmur  ABD: Soft, nondistended, nontender, normoactive bowel sounds  EXT: No edema, nontende  SKIN: Warm and well perfused, no rashes noted.  NEURO: Alert, oriented, interactive, nonfocal    DEVICES:     RADIOLOGY:   < from: CT Abdomen and Pelvis w/ IV Cont (02.26.23 @ 18:57) >    IMPRESSION: Active GI bleed at the hepatic flexure.    A 3.5 cm left renal cystic lesion with enhancing nodule suspicious for   cystic RCC.    < end of copied text >

## 2023-02-27 NOTE — CONSULT NOTE ADULT - NS ATTEND AMEND GEN_ALL_CORE FT
82 year old woman with syncope and hematochezia, CTA positive for bleeding on admission but now withtout bleeding/stable.     Feel like syncope was secondary to blood loss and not from another cause (ie. syncope from cardiac and then hematochezia from ischemic colitis).   Patient has not had colonoscopy in several years. Will perform colonoscopy to r/o mass lesion.   Prepping for colonoscopy on Tuesday.
Patient was seen and examined by me, agree with above note, where necessary edits were made.   Patient was aware of left kidney lesion, did not know concern for malignancy.   Recommended to follow with Dr Daniel after discharge.

## 2023-02-27 NOTE — CONSULT NOTE ADULT - SUBJECTIVE AND OBJECTIVE BOX
Patient is a 82y old  Female who presents with a chief complaint of LGIB, syncope (27 Feb 2023 10:26)    HPI:  This is an 82 year old female with significant pas medical history of Htn, HLD, BIBEMS following witnessed syncopal episode with rectal bleeding.   In ED, CTA +active bleeding at hepatic flexure. Transfused one unit PRBC in ED. Hgb stable 10's.   Patient evaluated this morning at bedside. Per patient, last evening she passed out then awoke with large rectal bleed with bright red blood around her and on her clothing. She had two more similar episodes upon arrival in ED. She denies any previous similar episodes. Denies any blood thinning agents. Does take baby ASA. Last colonoscopy 15+ years ago. Denies any associated abdominal pain or cramping. Admits she has not had bowel movement since ED. Denies any current lightheadedness, dizziness, chest pain , or shortness of breath. Denies hematemesis or melena. Does endorse loose stools for two days prior to bleeding event, but does not recall color. Denies any ill contacts, consumption of raw or undercooked foods, or recent travel. Denies fever or chills. Hgb currently stable.     PAST MEDICAL & SURGICAL HISTORY:  High blood pressure      Hyperlipidemia      Macular degeneration      S/P appendectomy    S/P dilation and curettage    MEDICATIONS  (STANDING):  atorvastatin 20 milliGRAM(s) Oral at bedtime  chlorhexidine 4% Liquid 1 Application(s) Topical <User Schedule>  enalapril 20 milliGRAM(s) Oral daily  lactated ringers. 1000 milliLiter(s) (75 mL/Hr) IV Continuous <Continuous>    MEDICATIONS  (PRN):    Allergies    No Known Allergies    Intolerances    SOCIAL HISTORY:    FAMILY HISTORY:    REVIEW OF SYSTEMS:    CONSTITUTIONAL: No weakness, fevers or chills  EYES/ENT: No visual changes;  No vertigo or throat pain   NECK: No pain or stiffness  RESPIRATORY: No cough, wheezing, hemoptysis; No shortness of breath  CARDIOVASCULAR: No chest pain or palpitations  GASTROINTESTINAL: See HPI  GENITOURINARY: No dysuria, frequency or hematuria  NEUROLOGICAL: No numbness or weakness  SKIN: No itching, burning, rashes, or lesions   PSYCH: Normal mood and affect  All other review of systems is negative unless indicated above.    Vital Signs Last 24 Hrs  T(C): 36.8 (27 Feb 2023 05:44), Max: 36.9 (26 Feb 2023 17:00)  T(F): 98.2 (27 Feb 2023 05:44), Max: 98.5 (26 Feb 2023 17:00)  HR: 89 (27 Feb 2023 10:00) (67 - 101)  BP: 151/74 (27 Feb 2023 10:00) (81/69 - 165/87)  BP(mean): 93 (27 Feb 2023 10:00) (65 - 100)  RR: 19 (27 Feb 2023 10:00) (15 - 21)  SpO2: 95% (27 Feb 2023 10:00) (95% - 100%)    Parameters below as of 27 Feb 2023 04:00  Patient On (Oxygen Delivery Method): room air        PHYSICAL EXAM:    Constitutional: No acute distress, well-developed, non-toxic appearing  HEENT: masked, good phonation, not icteric  Neck: supple, no lymphadenopathy  Respiratory: clear to ascultation bilaterally, no wheezing  Cardiovascular: S1 and S2, regular rate and rhythm, no murmurs rubs or gallops  Gastrointestinal: soft, non-tender, non-distended, +bowel sounds, no rebound or guarding, no surgical scars, no drains  Extremities: No peripheral edema, no cyanosis or clubbing  Vascular: 2+ peripheral pulses, no venous stasis  Neurological: A/O x 3, no focal deficits, no asterixis  Psychiatric: Normal mood, normal affect  Skin: No rashes, not jaundiced    LABS:                        10.3   7.15  )-----------( 207      ( 27 Feb 2023 12:08 )             30.2     02-27    139  |  111<H>  |  11  ----------------------------<  123<H>  3.7   |  24  |  0.53    Ca    8.2<L>      27 Feb 2023 04:48  Phos  3.0     02-27  Mg     2.1     02-27    TPro  6.3  /  Alb  3.1<L>  /  TBili  0.6  /  DBili  x   /  AST  21  /  ALT  26  /  AlkPhos  57  02-26    PT/INR - ( 26 Feb 2023 20:03 )   PT: 12.2 sec;   INR: 1.05 ratio         PTT - ( 26 Feb 2023 20:03 )  PTT:25.1 sec  LIVER FUNCTIONS - ( 26 Feb 2023 18:36 )  Alb: 3.1 g/dL / Pro: 6.3 gm/dL / ALK PHOS: 57 U/L / ALT: 26 U/L / AST: 21 U/L / GGT: x             RADIOLOGY & ADDITIONAL STUDIES:  IMPRESSION: Active GI bleed at the hepatic flexure.    A 3.5 cm left renal cystic lesion with enhancing nodule suspicious for   cystic RCC. Patient is a 82y old  Female who presents with a chief complaint of LGIB, syncope (27 Feb 2023 10:26)    82 year old woman with HTN, HLD, admitted with syncope and GI bleeding.     Patient states that night prior to admission she had unwitnessed syncope in bed (didn't hit head as far as she knows) without proceeding chest pain or shortness of breath or dizziness. Awoke with red blood on her and her clothing. No abdominal pain before or after episode. No rectal pain. Had two more large volume episodes of hematochezia without syncope. No hematemesis or melena. Has not had any further episodes since admitted to the floor. Denies any blood thinners. Last colonoscopy over 15 years ago. This has never happened before. Does endorse loose stools for two days prior to bleeding event, but does not recall color. Denies any ill contacts, consumption of raw or undercooked foods, or recent travel. Denies fever or chills. Hgb currently stable. CTA positive on admission.     PAST MEDICAL & SURGICAL HISTORY:  High blood pressure      Hyperlipidemia      Macular degeneration      S/P appendectomy    S/P dilation and curettage    MEDICATIONS  (STANDING):  atorvastatin 20 milliGRAM(s) Oral at bedtime  chlorhexidine 4% Liquid 1 Application(s) Topical <User Schedule>  enalapril 20 milliGRAM(s) Oral daily  lactated ringers. 1000 milliLiter(s) (75 mL/Hr) IV Continuous <Continuous>    MEDICATIONS  (PRN):    Allergies    No Known Allergies    Intolerances    SOCIAL HISTORY:  no smoking, drinking or drugs    FAMILY HISTORY:  no colon or stomach cancer    REVIEW OF SYSTEMS:    CONSTITUTIONAL: No weakness, fevers or chills  EYES/ENT: No visual changes;  No vertigo or throat pain   NECK: No pain or stiffness  RESPIRATORY: No cough, wheezing, hemoptysis; No shortness of breath  CARDIOVASCULAR: No chest pain or palpitations  GASTROINTESTINAL: See HPI  GENITOURINARY: No dysuria, frequency or hematuria  NEUROLOGICAL: No numbness or weakness, +sycnope  SKIN: No itching, burning, rashes, or lesions   PSYCH: Normal mood and affect  All other review of systems is negative unless indicated above.    Vital Signs Last 24 Hrs  T(C): 36.8 (27 Feb 2023 05:44), Max: 36.9 (26 Feb 2023 17:00)  T(F): 98.2 (27 Feb 2023 05:44), Max: 98.5 (26 Feb 2023 17:00)  HR: 89 (27 Feb 2023 10:00) (67 - 101)  BP: 151/74 (27 Feb 2023 10:00) (81/69 - 165/87)  BP(mean): 93 (27 Feb 2023 10:00) (65 - 100)  RR: 19 (27 Feb 2023 10:00) (15 - 21)  SpO2: 95% (27 Feb 2023 10:00) (95% - 100%)    Parameters below as of 27 Feb 2023 04:00  Patient On (Oxygen Delivery Method): room air        PHYSICAL EXAM:    Constitutional: No acute distress, well-developed, non-toxic appearing  HEENT: masked, good phonation, not icteric  Neck: supple, no lymphadenopathy  Respiratory: clear to ascultation bilaterally, no wheezing  Cardiovascular: S1 and S2, regular rate and rhythm, no murmurs rubs or gallops  Gastrointestinal: soft, non-tender, non-distended, +bowel sounds, no rebound or guarding, no surgical scars, no drains  Extremities: No peripheral edema, no cyanosis or clubbing  Vascular: 2+ peripheral pulses, no venous stasis  Neurological: A/O x 3, no focal deficits, no asterixis  Psychiatric: Normal mood, normal affect  Skin: No rashes, not jaundiced    LABS:                        10.3   7.15  )-----------( 207      ( 27 Feb 2023 12:08 )             30.2     02-27    139  |  111<H>  |  11  ----------------------------<  123<H>  3.7   |  24  |  0.53    Ca    8.2<L>      27 Feb 2023 04:48  Phos  3.0     02-27  Mg     2.1     02-27    TPro  6.3  /  Alb  3.1<L>  /  TBili  0.6  /  DBili  x   /  AST  21  /  ALT  26  /  AlkPhos  57  02-26    PT/INR - ( 26 Feb 2023 20:03 )   PT: 12.2 sec;   INR: 1.05 ratio         PTT - ( 26 Feb 2023 20:03 )  PTT:25.1 sec  LIVER FUNCTIONS - ( 26 Feb 2023 18:36 )  Alb: 3.1 g/dL / Pro: 6.3 gm/dL / ALK PHOS: 57 U/L / ALT: 26 U/L / AST: 21 U/L / GGT: x             RADIOLOGY & ADDITIONAL STUDIES:  IMPRESSION: Active GI bleed at the hepatic flexure.    A 3.5 cm left renal cystic lesion with enhancing nodule suspicious for   cystic RCC.

## 2023-02-27 NOTE — CONSULT NOTE ADULT - ASSESSMENT
82 year old female with rectal bleeding & syncope.    Imp: + active bleed @ hepatic flexure, resolved for now    No hematochezia and HH stable for now.  Last colonoscopy 15+ years ago.    Rec:  ::Colonoscopy tomorrow  ::Clear liquid diet now  ::Bowel prep today  ::NPO santy MN
A/P:  83 y/o female with left renal lesion        Explained to patient that once discharged home, pt should follow up with Dr. Daniel [phone] for further evaluation of the left renal lesion.       Above discussed with Dr. Perdomo

## 2023-02-27 NOTE — PROGRESS NOTE ADULT - ASSESSMENT
ASSESSMENT  81yo female with PMHx HTN, HLD, macular degeneration, sp appy, sp D/C BIBEMS following witnessed syncopal episode associated with rectal bleeding.     Workup in ED showed anemia, CTA/P angio with active bleeding at hepatic flexure  Incidental finding 3.5cm left renal cystic lesion suspicious for cystic RCC. pt states she remembers her PCP telling her she had a "cyst" on her kidney but could not remember details    Admitted for  1. Syncope suspect 2/2  2. symptomatic anemia in setting of LGIB likely diverticular       PLAN ASSESSMENT  81yo female with PMHx HTN, HLD, macular degeneration, sp appy, sp D/C BIBEMS following witnessed syncopal episode associated with rectal bleeding.     Workup in ED showed anemia, CTA/P angio with active bleeding at hepatic flexure  Incidental finding 3.5cm left renal cystic lesion suspicious for cystic RCC. pt states she remembers her PCP telling her she had a "cyst" on her kidney but could not remember details    Admitted for  1. Syncope suspect 2/2  2. symptomatic anemia in setting of LGIB likely diverticular   3. 3.5cm L renal lesion      PLAN  - mentation intact. AAO x 3  - BP mildly elevated. restarted home dose enalapril with parameters. cont statin  - check orthostatics and TTE  - on room air  - started on clear liquids for now. GI and IR consulted. no acute intervention  - cont LR @ 75 for now.   - incidental 3.5 cm L renal cyst seen on CT imaging. urology consulted.   - no chemical DVT ppx in setting of LGIB. cont SCDs. received total 1u PRBC this admisson  PT eval    Dispo:     Will discuss with Dr. Mccarthy

## 2023-02-27 NOTE — CONSULT NOTE ADULT - SUBJECTIVE AND OBJECTIVE BOX
This is an 83 y/o female w/ PMH of Htn, HLD, BIBEMS following witnessed syncopal episode with rectal bleeding.   In ED, CTA +active bleeding at hepatic flexure.  On admission CT, a 3.5 cm left renal lesion was noted.  Pt states her PMD told her about it.  She did not follow up with a urologist.  Pt denies, hematuria, flank pain, fever, chills or any other problems. Pt is currently admitted for rectal bleeding and a syncopal episode at home.    PAST MEDICAL & SURGICAL HISTORY:  High blood pressure      Hyperlipidemia      Macular degeneration      S/P appendectomy    S/P dilation and curettage    MEDICATIONS  (STANDING):  atorvastatin 20 milliGRAM(s) Oral at bedtime  chlorhexidine 4% Liquid 1 Application(s) Topical <User Schedule>  enalapril 20 milliGRAM(s) Oral daily  lactated ringers. 1000 milliLiter(s) (75 mL/Hr) IV Continuous <Continuous>    MEDICATIONS  (PRN):    Allergies    No Known Allergies    Intolerances    SOCIAL HISTORY:    FAMILY HISTORY:    REVIEW OF SYSTEMS:    CONSTITUTIONAL: No weakness, fevers or chills  EYES/ENT: No visual changes;  No vertigo or throat pain   NECK: No pain or stiffness  RESPIRATORY: No cough, wheezing, hemoptysis; No shortness of breath  CARDIOVASCULAR: No chest pain or palpitations  GASTROINTESTINAL: See HPI  GENITOURINARY: No dysuria, frequency or hematuria  NEUROLOGICAL: No numbness or weakness  SKIN: No itching, burning, rashes, or lesions   PSYCH: Normal mood and affect  All other review of systems is negative unless indicated above.    PE:  83 y/o female resting comfortably in bed in no acute distress    Vital Signs Last 24 Hrs  T(C): 36.6 (27 Feb 2023 13:27), Max: 36.9 (26 Feb 2023 17:00)  T(F): 97.8 (27 Feb 2023 13:27), Max: 98.5 (26 Feb 2023 17:00)  HR: 89 (27 Feb 2023 10:00) (67 - 101)  BP: 151/74 (27 Feb 2023 10:00) (81/69 - 165/87)  BP(mean): 93 (27 Feb 2023 10:00) (65 - 100)  RR: 19 (27 Feb 2023 10:00) (15 - 21)  SpO2: 95% (27 Feb 2023 10:00) (95% - 100%)    Parameters below as of 27 Feb 2023 04:00  Patient On (Oxygen Delivery Method): room air    A+O x 3    Head:  NC/AT, no lesions noted  Neck: Soft/supple  Heart: RRR, S1S2 normal  Lungs: CTA bilat, no rales, rhonchi or wheezes  Back: No CVA tenderness  Abd: Soft NT/ND, +BS         No bladder palp  : Deferred  Lower Ext: No calf tenderness  Neuro: Grossly intact  Skin: Warm and dry      LABS:                          10.3   7.15  )-----------( 207      ( 27 Feb 2023 12:08 )             30.2     02-27    139  |  111<H>  |  11  ----------------------------<  123<H>  3.7   |  24  |  0.53    Ca    8.2<L>      27 Feb 2023 04:48  Phos  3.0     02-27  Mg     2.1     02-27    TPro  6.3  /  Alb  3.1<L>  /  TBili  0.6  /  DBili  x   /  AST  21  /  ALT  26  /  AlkPhos  57  02-26    LIVER FUNCTIONS - ( 26 Feb 2023 18:36 )  Alb: 3.1 g/dL / Pro: 6.3 gm/dL / ALK PHOS: 57 U/L / ALT: 26 U/L / AST: 21 U/L / GGT: x           PT/INR - ( 26 Feb 2023 20:03 )   PT: 12.2 sec;   INR: 1.05 ratio         PTT - ( 26 Feb 2023 20:03 )  PTT:25.1 sec    ACC: [id number] EXAM:  CT ABDOMEN AND PELVIS IC   ORDERED BY: MONSE BLUE     PROCEDURE DATE:  02/26/2023          INTERPRETATION:  CLINICAL INFORMATION: maroon colored stool JCT    COMPARISON: None.    CONTRAST/COMPLICATIONS:  IV Contrast: Omnipaque 350  90 cc administered   10 cc discarded  Oral Contrast: NONE  Complications: None reported at time of study completion    PROCEDURE:  CT of the Abdomen and Pelvis was performed.  Precontrast, Arterial and Delayed phases were performed.  Sagittal and coronal reformats were performed.    FINDINGS:    LOWER CHEST: Moderate hiatal hernia.    LIVER: Within normal limits.  BILE DUCTS: Normal caliber.  GALLBLADDER: Within normal limits.  SPLEEN: Within normal limits.  PANCREAS: 1.2 cm cyst in the uncinate process.  ADRENALS: Within normal limits.  KIDNEYS/URETERS: There is a 3.5 cm exophytic left anterior midpole lesion   with enhancing component inferiorly.    BLADDER: Within normal limits.  REPRODUCTIVE ORGANS: The uterus and adnexa are within normal limits.    BOWEL: No bowel obstruction. There is active GI bleeding at the hepatic   flexure.  PERITONEUM: No ascites.  VESSELS:  A 1 cm splenic artery aneurysm series 4 image 11.  RETROPERITONEUM/LYMPH NODES: No lymphadenopathy.  ABDOMINAL WALL: Within normal limits.  BONES: Within normal limits.    IMPRESSION: Active GI bleed at the hepatic flexure.    A 3.5 cm left renal cystic lesion with enhancing nodule suspicious for   cystic RCC.

## 2023-02-28 ENCOUNTER — TRANSCRIPTION ENCOUNTER (OUTPATIENT)
Age: 83
End: 2023-02-28

## 2023-02-28 VITALS — TEMPERATURE: 99 F

## 2023-02-28 LAB
ANION GAP SERPL CALC-SCNC: 6 MMOL/L — SIGNIFICANT CHANGE UP (ref 5–17)
BUN SERPL-MCNC: 6 MG/DL — LOW (ref 7–23)
CALCIUM SERPL-MCNC: 8.1 MG/DL — LOW (ref 8.5–10.1)
CHLORIDE SERPL-SCNC: 113 MMOL/L — HIGH (ref 96–108)
CO2 SERPL-SCNC: 24 MMOL/L — SIGNIFICANT CHANGE UP (ref 22–31)
CREAT SERPL-MCNC: 0.46 MG/DL — LOW (ref 0.5–1.3)
EGFR: 96 ML/MIN/1.73M2 — SIGNIFICANT CHANGE UP
GLUCOSE SERPL-MCNC: 101 MG/DL — HIGH (ref 70–99)
HCT VFR BLD CALC: 26.8 % — LOW (ref 34.5–45)
HGB BLD-MCNC: 9 G/DL — LOW (ref 11.5–15.5)
MAGNESIUM SERPL-MCNC: 2 MG/DL — SIGNIFICANT CHANGE UP (ref 1.6–2.6)
MCHC RBC-ENTMCNC: 31.3 PG — SIGNIFICANT CHANGE UP (ref 27–34)
MCHC RBC-ENTMCNC: 33.6 GM/DL — SIGNIFICANT CHANGE UP (ref 32–36)
MCV RBC AUTO: 93.1 FL — SIGNIFICANT CHANGE UP (ref 80–100)
PHOSPHATE SERPL-MCNC: 2.6 MG/DL — SIGNIFICANT CHANGE UP (ref 2.5–4.5)
PLATELET # BLD AUTO: 192 K/UL — SIGNIFICANT CHANGE UP (ref 150–400)
POTASSIUM SERPL-MCNC: 3.2 MMOL/L — LOW (ref 3.5–5.3)
POTASSIUM SERPL-SCNC: 3.2 MMOL/L — LOW (ref 3.5–5.3)
RBC # BLD: 2.88 M/UL — LOW (ref 3.8–5.2)
RBC # FLD: 13.3 % — SIGNIFICANT CHANGE UP (ref 10.3–14.5)
SODIUM SERPL-SCNC: 143 MMOL/L — SIGNIFICANT CHANGE UP (ref 135–145)
WBC # BLD: 5.49 K/UL — SIGNIFICANT CHANGE UP (ref 3.8–10.5)
WBC # FLD AUTO: 5.49 K/UL — SIGNIFICANT CHANGE UP (ref 3.8–10.5)

## 2023-02-28 PROCEDURE — 99232 SBSQ HOSP IP/OBS MODERATE 35: CPT

## 2023-02-28 PROCEDURE — 45378 DIAGNOSTIC COLONOSCOPY: CPT

## 2023-02-28 RX ORDER — POTASSIUM CHLORIDE 20 MEQ
10 PACKET (EA) ORAL
Refills: 0 | Status: COMPLETED | OUTPATIENT
Start: 2023-02-28 | End: 2023-02-28

## 2023-02-28 RX ADMIN — Medication 20 MILLIGRAM(S): at 09:23

## 2023-02-28 RX ADMIN — Medication 100 MILLIEQUIVALENT(S): at 12:53

## 2023-02-28 RX ADMIN — Medication 100 MILLIEQUIVALENT(S): at 08:37

## 2023-02-28 RX ADMIN — Medication 100 MILLIEQUIVALENT(S): at 10:32

## 2023-02-28 RX ADMIN — SODIUM CHLORIDE 75 MILLILITER(S): 9 INJECTION, SOLUTION INTRAVENOUS at 06:02

## 2023-02-28 NOTE — DISCHARGE NOTE PROVIDER - NSDCCPCAREPLAN_GEN_ALL_CORE_FT
PRINCIPAL DISCHARGE DIAGNOSIS  Diagnosis: GI bleed  Assessment and Plan of Treatment: You were admitted for a lower GI bleed due to diverticulosis. You were seen by gastroenteroloist. You had a colonoscopy on 2/28 revealing diverticulosis throughout the colon and no active bleeding. You may resume a normal diet. If you experience dizziness, lightheadedness, abdominal pain, blood in stool please contact your PCP or return to the ED if severe.      SECONDARY DISCHARGE DIAGNOSES  Diagnosis: Left renal mass  Assessment and Plan of Treatment: You had an incidental finding of cystic mass on your Left kidney. you were seen by a urologist who recommended follow up as outpatient. please follow up with Dr. Daniel for further management.

## 2023-02-28 NOTE — DISCHARGE NOTE PROVIDER - HOSPITAL COURSE
BRIEF HOSPITAL COURSE: 83yo female with PMHx HTN, HLD, macular degeneration, sp appy, sp D/C BIBEMS following witnessed syncopal episode associated with rectal bleeding.  As per ER noted with one episode of bloody stools in ED (small), hemodynamics stable. Pt also admits to 2days of diarrhea unsure if bloody given the severity of her macular degeneration. Denies CP, SOB, abd pain, vomiting, hematemesis, prior GI bleeding, Last colonscopy >15years ago which she reports was normal. Admits to daily baby ASA use, no AC or NSAIDs, IR consulted felt that bleeding is diverticular in nature.   Patient also had a syncope episode in ED suspect vasovagal, anemia    Workup in ED showed anemia, CTA/P angio with active bleeding at hepatic flexure  Incidental finding 3.5cm left renal cystic lesion suspicious for cystic RCC    Admitted for  1. Syncope suspect 2/2  2. symptomatic anemia in setting of LGIB likely diverticular   3. 3.5cm L renal lesion    Pt seen by IR, no acute intervention as bleed likely diverticular in nature.  BP stable. okay to resume ASA. d/w GI  Received 1unit pRBC  TTE EF 60-65%, diastolic dysfxn  orthostatics neg  Syncope likely related to GIB.  Colonoscopy done on 2/28 revealing pan diverticulosis, worse on right side. no active bleeding. okay for discharge as per GI  Electrolytes repleted  Seen by urology for renal mass, patient to f/u with Dr. Gonzalez as outpatient for further workup.  Evaluated by PT  No further bloody bowel movements since admission  recommend f/u with PCP for repeat blood work to follow up CBC         BRIEF HOSPITAL COURSE: 81yo female with PMHx HTN, HLD, macular degeneration, sp appy, sp D/C BIBEMS following witnessed syncopal episode associated with rectal bleeding.  As per ER noted with one episode of bloody stools in ED (small), hemodynamics stable. Pt also admits to 2days of diarrhea unsure if bloody given the severity of her macular degeneration. Denies CP, SOB, abd pain, vomiting, hematemesis, prior GI bleeding, Last colonscopy >15years ago which she reports was normal. Admits to daily baby ASA use, no AC or NSAIDs, IR consulted felt that bleeding is diverticular in nature.   Patient also had a syncope episode in ED suspect vasovagal, anemia    Workup in ED showed anemia, CTA/P angio with active bleeding at hepatic flexure  Incidental finding 3.5cm left renal cystic lesion suspicious for cystic RCC    Admitted for  1. Syncope suspect 2/2  2. symptomatic anemia in setting of LGIB likely diverticular   3. 3.5cm L renal lesion    Pt seen by IR, no acute intervention as bleed likely diverticular in nature.  BP stable. okay to resume ASA. d/w GI  Received 1unit pRBC  TTE EF 60-65%, diastolic dysfxn  orthostatics neg  Syncope likely related to GIB.  Colonoscopy done on 2/28 revealing pan diverticulosis, worse on right side. no active bleeding. okay for discharge as per GI  Electrolytes repleted  Seen by urology for renal mass, patient to f/u with Dr. Gonzalez as outpatient for further workup.  Evaluated by PT  No further bloody bowel movements since admission  recommend f/u with PCP for repeat blood work to follow up CBC    Will discuss with Dr. Mccarthy. d/w GI

## 2023-02-28 NOTE — PHYSICAL THERAPY INITIAL EVALUATION ADULT - MODALITIES TREATMENT COMMENTS
pt left in bed supine post Eval @ pt request; bed alarm on; IV, HM, BP cuff, pulse oxym in place; DIL present; callbell in reach; pt instructed not to get up alone; call nursing for assist; benny well; denied pain

## 2023-02-28 NOTE — DISCHARGE NOTE NURSING/CASE MANAGEMENT/SOCIAL WORK - PATIENT PORTAL LINK FT
You can access the FollowMyHealth Patient Portal offered by [facility] by registering at the following website: [email or web address]. By joining [employer]’s FollowMyHealth portal, you will also be able to view your health information using other applications (apps) compatible with our system.

## 2023-02-28 NOTE — DISCHARGE NOTE PROVIDER - NSDCCAREPROVSEEN_GEN_ALL_CORE_FT
Candida, Jose Guadalupe Young, Darlene Davis, Jayson Dyreyes, Hossein Mccarthy, Bahman Crandall, Jaclyn Perdomo, John Mcnamara, Trell Cuevas, Gonsalo Mcnamara, Nati Soria, Darwin Wells, Dereje MARROQUIN

## 2023-02-28 NOTE — PROGRESS NOTE ADULT - NS ATTEND AMEND GEN_ALL_CORE FT
Patient seen with LUPE Young  Patient admitted with LGIB, acute blood loss anemia, Postiive CTA in colon  incidental renal cyst    Patient with one BM overnight, no bloody bowel movement  since.  bp ok  hgb drifted down a little bit  For endoscopy today.  diet after colonoscopy
Patient seen with LUPE Young  Patient admitted with BRBPR and near syncope  cta was possible for colonic bleed, likely diverticular  Patient was only on a baby aspirin.  no hx. cardiac disease.  No turther bleeding overnight  hgb is stable  Plan clear liquids  being seen by GI, possible  colonoscopy tomorrow

## 2023-02-28 NOTE — PROGRESS NOTE ADULT - SUBJECTIVE AND OBJECTIVE BOX
Patient is a 82y old  Female who presents with a chief complaint of     BRIEF HOSPITAL COURSE: 83yo female with PMHx HTN, HLD, macular degeneration, sp appy, sp D/C BIBEMS following witnessed syncopal episode associated with rectal bleeding. Workup in ED showed anemia, CTA/P angio with active bleeding at hepatic flexure. As per ER noted with one episode of bloody stools in ED (small), hemodynamics stable. Pt also admits to 2days of diarrhea unsure if bloody given the severity of her macular degeneration. Denies CP, SOB, abd pain, vomiting, hematemesis, prior GI bleeding, Last colonscopy >15years ago which she reports was normal. Admits to daily baby ASA use, no AC or NSAIDs, IR consulted felt that bleeding is diverticular in nature.     2/27 pt reports feeling well. denies abd pain , nausea, dizziness, c/o dry lips and mouth. no bloody BM overnight.   2/28 pt reports feeling well. no dizziness, abd pain, had bowel prep last night    PAST MEDICAL & SURGICAL HISTORY:  High blood pressure  Hyperlipidemia  Macular degeneration  S/P appendectomy  S/P dilation and curettage    Medications:  enalapril 20 milliGRAM(s) Oral daily  atorvastatin 20 milliGRAM(s) Oral at bedtime  lactated ringers. 1000 milliLiter(s) IV Continuous <Continuous>  chlorhexidine 4% Liquid 1 Application(s) Topical <User Schedule>    Vital Signs Last 24 Hrs  T(C): 36.5 (28 Feb 2023 09:00), Max: 36.6 (27 Feb 2023 13:27)  T(F): 97.7 (28 Feb 2023 09:00), Max: 97.8 (27 Feb 2023 13:27)  HR: 87 (28 Feb 2023 10:00) (77 - 125)  BP: 155/74 (28 Feb 2023 10:00) (103/84 - 157/79)  BP(mean): 94 (28 Feb 2023 10:00) (62 - 103)  RR: 17 (28 Feb 2023 10:00) (14 - 23)  SpO2: 97% (28 Feb 2023 10:00) (90% - 99%)    Parameters below as of 28 Feb 2023 07:00  Patient On (Oxygen Delivery Method): room air    LABS:                                9.0    5.49  )-----------( 192      ( 28 Feb 2023 05:51 )             26.8     02-28    143  |  113<H>  |  6<L>  ----------------------------<  101<H>  3.2<L>   |  24  |  0.46<L>    Ca    8.1<L>      28 Feb 2023 05:51  Phos  2.6     02-28  Mg     2.0     02-28    TPro  6.3  /  Alb  3.1<L>  /  TBili  0.6  /  DBili  x   /  AST  21  /  ALT  26  /  AlkPhos  57  02-26  LIVER FUNCTIONS - ( 26 Feb 2023 18:36 )  Alb: 3.1 g/dL / Pro: 6.3 gm/dL / ALK PHOS: 57 U/L / ALT: 26 U/L / AST: 21 U/L / GGT: x           PT/INR - ( 26 Feb 2023 20:03 )   PT: 12.2 sec;   INR: 1.05 ratio    PTT - ( 26 Feb 2023 20:03 )  PTT:25.1 se      CULTURES:    Physical Examination:    General: No acute distress.  awake and alert  HEENT: Pupils equal, reactive to light.  Symmetric. legally blind. dry mucous membranes  PULM: Clear to auscultation bilaterally, no crackles or wheezing  NECK: Supple, no lymphadenopathy, trachea midline  CVS: Regular rate and rhythm, no murmur  ABD: Soft, nondistended, nontender, normoactive bowel sounds  EXT: No edema, nontende  SKIN: Warm and well perfused, no rashes noted.  NEURO: Alert, oriented, interactive, nonfocal    DEVICES:     RADIOLOGY:   < from: CT Abdomen and Pelvis w/ IV Cont (02.26.23 @ 18:57) >    IMPRESSION: Active GI bleed at the hepatic flexure.    A 3.5 cm left renal cystic lesion with enhancing nodule suspicious for   cystic RCC.    < end of copied text >

## 2023-02-28 NOTE — DISCHARGE NOTE PROVIDER - CARE PROVIDERS DIRECT ADDRESSES
,DirectAddress_Unknown,DirectAddress_Unknown,[email or web address]

## 2023-02-28 NOTE — PROGRESS NOTE ADULT - ASSESSMENT
ASSESSMENT  81yo female with PMHx HTN, HLD, macular degeneration, sp appy, sp D/C BIBEMS following witnessed syncopal episode associated with rectal bleeding.     Workup in ED showed anemia, CTA/P angio with active bleeding at hepatic flexure  Incidental finding 3.5cm left renal cystic lesion suspicious for cystic RCC. pt states she remembers her PCP telling her she had a "cyst" on her kidney but could not remember details    Admitted for  1. Syncope suspect 2/2  2. symptomatic anemia in setting of LGIB likely diverticular   3. 3.5cm L renal lesion      PLAN  - mentation intact. AAO x 3  - BP better. restarted home dose enalapril. cont statin  - TTE EF 60-65%, diastolic dysfxn  - on room air  - NPO for colonoscopy today.  - cont LR @ 75 for now. repleted K IV  - incidental 3.5 cm L renal cyst seen on CT imaging. urology consulted, f/u as outpatient  - hgb drifting down. no chemical DVT ppx in setting of LGIB. cont SCDs. received total 1u PRBC this admisson  PT eval    Dispo: NPO for colonoscopy.     Will discuss with Dr. Mccarthy

## 2023-02-28 NOTE — DISCHARGE NOTE PROVIDER - NSDCMRMEDTOKEN_GEN_ALL_CORE_FT
Aspir 81 oral delayed release tablet: 1 tab(s) orally once a day  atorvastatin 20 mg oral tablet: 1 tab(s) orally once a day  cholecalciferol 25 mcg (1000 intl units) oral tablet: 1 tab(s) orally once a day  enalapril 20 mg oral tablet: 1 tab(s) orally once a day  PreserVision AREDS oral capsule: 1 cap(s) orally once a day

## 2023-02-28 NOTE — DISCHARGE NOTE PROVIDER - CARE PROVIDER_API CALL
Clark Mcintyre  INTERNAL MEDICINE  [address] [place] [address]  Phone: [phone]  Fax: [phone]  Follow Up Time: 2 weeks    Darwin Soria)  Gastroenterology; Internal Medicine  [address] [facility], [place] [address]  Phone: [phone]  Fax: [phone]  Follow Up Time:     Shon Daniel)  Urology  [address] [place] [address]  Phone: [phone]  Fax: [phone]  Follow Up Time: 2 weeks

## 2023-02-28 NOTE — DISCHARGE NOTE PROVIDER - PROVIDER TOKENS
PROVIDER:[TOKEN:[[id number]],FOLLOWUP:[2 weeks]],PROVIDER:[TOKEN:[[id number]]],PROVIDER:[TOKEN:[[id number]],FOLLOWUP:[2 weeks]]

## 2023-02-28 NOTE — DISCHARGE NOTE PROVIDER - ATTENDING DISCHARGE PHYSICAL EXAMINATION:
Patient no further bleeding, colonoscopy, only diverticulosis, no active bleeding  d/w GI can be d/c home

## 2023-02-28 NOTE — DISCHARGE NOTE NURSING/CASE MANAGEMENT/SOCIAL WORK - NSDCPEFALRISK_GEN_ALL_CORE
For information on Fall & Injury Prevention, visit: [email or web address] OR  [email or web address] OR  https://www.cdc.gov/steadi/patient.html

## 2023-03-06 DIAGNOSIS — I10 ESSENTIAL (PRIMARY) HYPERTENSION: ICD-10-CM

## 2023-03-06 DIAGNOSIS — K57.31 DIVERTICULOSIS OF LARGE INTESTINE WITHOUT PERFORATION OR ABSCESS WITH BLEEDING: ICD-10-CM

## 2023-03-06 DIAGNOSIS — K64.8 OTHER HEMORRHOIDS: ICD-10-CM

## 2023-03-06 DIAGNOSIS — H35.30 UNSPECIFIED MACULAR DEGENERATION: ICD-10-CM

## 2023-03-06 DIAGNOSIS — N28.1 CYST OF KIDNEY, ACQUIRED: ICD-10-CM

## 2023-03-06 DIAGNOSIS — Z79.82 LONG TERM (CURRENT) USE OF ASPIRIN: ICD-10-CM

## 2023-03-06 DIAGNOSIS — E78.5 HYPERLIPIDEMIA, UNSPECIFIED: ICD-10-CM

## 2023-03-06 DIAGNOSIS — R55 SYNCOPE AND COLLAPSE: ICD-10-CM

## 2023-03-06 DIAGNOSIS — D62 ACUTE POSTHEMORRHAGIC ANEMIA: ICD-10-CM

## 2023-06-08 ENCOUNTER — OFFICE (OUTPATIENT)
Dept: URBAN - METROPOLITAN AREA CLINIC 102 | Facility: CLINIC | Age: 83
Setting detail: OPHTHALMOLOGY
End: 2023-06-08
Payer: MEDICARE

## 2023-06-08 DIAGNOSIS — H25.12: ICD-10-CM

## 2023-06-08 DIAGNOSIS — H40.013: ICD-10-CM

## 2023-06-08 DIAGNOSIS — H35.3222: ICD-10-CM

## 2023-06-08 DIAGNOSIS — H26.491: ICD-10-CM

## 2023-06-08 DIAGNOSIS — Z96.1: ICD-10-CM

## 2023-06-08 DIAGNOSIS — H35.3213: ICD-10-CM

## 2023-06-08 PROCEDURE — 92014 COMPRE OPH EXAM EST PT 1/>: CPT | Performed by: OPHTHALMOLOGY

## 2023-06-08 PROCEDURE — 92134 CPTRZ OPH DX IMG PST SGM RTA: CPT | Performed by: OPHTHALMOLOGY

## 2023-06-08 ASSESSMENT — REFRACTION_MANIFEST
OU_VA: 20/80
OD_VA1: 20/HM
OD_CYLINDER: -1.00
OS_VA1: 20/80
OS_AXIS: 106
OS_SPHERE: +0.25
OS_AXIS: 098
OS_CYLINDER: -1.75
OS_CYLINDER: -1.25
OD_SPHERE: +0.75
OD_AXIS: 083
OS_VA1: 20/70+1
OS_SPHERE: -0.25

## 2023-06-08 ASSESSMENT — CONFRONTATIONAL VISUAL FIELD TEST (CVF)
OS_FINDINGS: FULL
OD_FINDINGS: FULL

## 2023-06-08 ASSESSMENT — REFRACTION_AUTOREFRACTION
OS_CYLINDER: -2.50
OD_CYLINDER: -1.25
OS_AXIS: 110
OD_AXIS: 135
OD_SPHERE: +0.75
OS_SPHERE: +1.25

## 2023-06-08 ASSESSMENT — KERATOMETRY
OD_K2POWER_DIOPTERS: 44.50
OD_K1POWER_DIOPTERS: 43.75
OS_K2POWER_DIOPTERS: 44.25
OS_K1POWER_DIOPTERS: 44.00
METHOD_AUTO_MANUAL: AUTO
OS_AXISANGLE_DEGREES: 010
OD_AXISANGLE_DEGREES: 179

## 2023-06-08 ASSESSMENT — AXIALLENGTH_DERIVED
OS_AL: 23.6058
OD_AL: 23.3169
OD_AL: 23.2694
OS_AL: 23.7038
OS_AL: 23.3645

## 2023-06-08 ASSESSMENT — REFRACTION_CURRENTRX
OS_AXIS: 087
OS_OVR_VA: 20/
OD_AXIS: 086
OD_CYLINDER: -1.50
OD_OVR_VA: 20/
OS_CYLINDER: -0.75
OD_VPRISM_DIRECTION: SV
OS_VPRISM_DIRECTION: SV
OS_SPHERE: PLANO
OD_SPHERE: PLANO

## 2023-06-08 ASSESSMENT — VISUAL ACUITY: OD_BCVA: 20/250-1

## 2023-06-08 ASSESSMENT — SPHEQUIV_DERIVED
OS_SPHEQUIV: -0.625
OD_SPHEQUIV: 0.25
OS_SPHEQUIV: -0.875
OD_SPHEQUIV: 0.125
OS_SPHEQUIV: 0

## 2023-06-08 ASSESSMENT — TONOMETRY
OD_IOP_MMHG: 15
OS_IOP_MMHG: 16
OD_IOP_MMHG: 16
OS_IOP_MMHG: 16